# Patient Record
(demographics unavailable — no encounter records)

---

## 2024-12-12 NOTE — CONSULT LETTER
[FreeTextEntry1] : Dear Dr. MAUREEN HENNESSY ,  I had the pleasure of consulting on ISSAC ROCHA today.  Below is my note regarding the office visit today.  Thank you so very much for allowing me to participate in BRENDAs  care.  Please don't hesitate to call me should any questions or issues arise .  Sincerely,   Marcin Sanderson MD, FACS, FSPU Chief, Pediatric Urology Professor of Urology and Pediatrics [facility] [employer]  President, [employer] - [place] Section Past-President, Societies for Pediatric Urology

## 2024-12-12 NOTE — ASSESSMENT
[FreeTextEntry1] : ISSAC has irregular and very asymmetric skin following the circumcision..  I discussed the options including observation and surgery. The principles of the operation and the anticipated postoperative course were discussed.  After discussing the risks and benefits and possible complications (including but not limited to penile injury, bleeding, infection, penile deformity and need for additional surgery), the decision to proceed with penoplasty surgery under general anesthesia was made.    They family wishes to proceed with this when he turns 5 years of age.  All questions were answered.

## 2024-12-12 NOTE — HISTORY OF PRESENT ILLNESS
[TextBox_4] : ISSAC is here for evaluation with his mother today. He was born at term after an unassisted conception and uneventful pregnancy. He was then circumcised in the nursery. The family's concerns with redundancy of the skin following the circumcision. The penis has not changed in its configuration since the parents first noticed this. No urinary tract or penile redness or infections. He makes ample wet diapers without hematuria.  No family history of penile abnormalities.  (Of note, patient's brother has history of coarctation of the aorta, brother now cleared by cardiology.)

## 2024-12-12 NOTE — CONSULT LETTER
[FreeTextEntry1] : Dear Dr. MAUREEN HENNESSY ,  I had the pleasure of consulting on ISSAC ROCHA today.  Below is my note regarding the office visit today.  Thank you so very much for allowing me to participate in BRENDAs  care.  Please don't hesitate to call me should any questions or issues arise .  Sincerely,   Marcin Sanderson MD, FACS, FSPU Chief, Pediatric Urology Professor of Urology and Pediatrics [facility] [employer]  President, [employer] - [place] Section Past-President, Societies [employer] Pediatric U[employer]

## 2024-12-12 NOTE — CONSULT LETTER
[FreeTextEntry1] : Dear Dr. MAUREEN HENNESSY ,  I had the pleasure of consulting on ISSAC ROCHA today.  Below is my note regarding the office visit today.  Thank you so very much for allowing me to participate in BRENDAs  care.  Please don't hesitate to call me should any questions or issues arise .  Sincerely,   Marcin Sanderson MD, FACS, FSPU Chief, Pediatric Urology Professor of Urology and Pediatrics [facility] [employer]  President, [employer] - [place] Section Past-President, Societies [employer] Pediatric Urology